# Patient Record
Sex: FEMALE | Race: WHITE | NOT HISPANIC OR LATINO | URBAN - METROPOLITAN AREA
[De-identification: names, ages, dates, MRNs, and addresses within clinical notes are randomized per-mention and may not be internally consistent; named-entity substitution may affect disease eponyms.]

---

## 2021-09-16 ENCOUNTER — OFFICE VISIT - RIVER FALLS (OUTPATIENT)
Dept: FAMILY MEDICINE | Facility: CLINIC | Age: 61
End: 2021-09-16

## 2021-09-16 ASSESSMENT — MIFFLIN-ST. JEOR: SCORE: 1527.74

## 2021-11-17 ENCOUNTER — OFFICE VISIT - RIVER FALLS (OUTPATIENT)
Dept: FAMILY MEDICINE | Facility: CLINIC | Age: 61
End: 2021-11-17

## 2021-11-17 ASSESSMENT — MIFFLIN-ST. JEOR: SCORE: 1476.94

## 2022-02-11 VITALS — HEIGHT: 68 IN | WEIGHT: 201.1 LBS | BODY MASS INDEX: 30.48 KG/M2

## 2022-02-11 VITALS — HEIGHT: 68 IN | WEIGHT: 189.9 LBS | BODY MASS INDEX: 28.78 KG/M2

## 2022-02-16 NOTE — NURSING NOTE
Quick Intake Entered On:  11/17/2021 12:08 PM CST    Performed On:  11/17/2021 12:07 PM CST by Suzan Champion               Summary   Weight Measured :   189.9 lb(Converted to: 189 lb 14 oz, 86.137 kg)    Height Measured :   67.5 in(Converted to: 5 ft 7 in, 171.45 cm)    Body Mass Index :   29.3 kg/m2 (HI)    Body Surface Area :   2.02 m2   Suzan Champion - 11/17/2021 12:07 PM CST

## 2022-02-16 NOTE — PROGRESS NOTES
Patient:   KACEY MC            MRN: [id number]            FIN: [id number]               Age:   61 years     Sex:  Female     :  1960   Associated Diagnoses:   Diabetes mellitus type 2; Hypercholesterolemia; Obesity; Hypertension   Author:   Suzan Champion      Visit Information   Visit type:  Diabetes Self Management Education .    Referral source:  Tobi IVERSON, Dr. Janina Soto.       Chief Complaint   DM Type II, Hypercholesterolemia, HTN, Obesity      History of Present Illness   Pt here with increasing a1c now at 8% on triple therapy.    Pt is given 14 day sample Kimberly 2 sensor to help pt understand how glycemic control is being affected by nutritional intake.      Discussed nutrition, diabetes, and lifestyle management with pt  Nutrition:  Pt does not follow a specific carb controlled meal plan  Fluids: water diet soda  Physical Activity:  no set exercise routine  Stress:   low/ mod  Sleep: good  Support: family   BG Testing: has not been testing and does not like to test  DM related medication: Ozempic 1mg weekly, Metformin XR 500mg 4 tabs daily, Jardiance 25mg daily - taking meds as directed   Routine diabetes care:    Dilated Retinal Eye Exam:  per pt annual    Skin: intact  Dental: goes 2x/ year  Feet: monofilament test completed with MD   Immunizations: influenza and pneumonia vaccines completed   Hgb A1c: 8% =  183mg/dL estimated average glucose      Health Status   Allergies:    Allergic Reactions (Selected)  Severity Not Documented  Byetta Prefilled Pen (Lip swelling)  Ibuprofen (No reactions were documented)  Naproxen (Hives)  Pertussis vaccines (Hives)   Medications:  (Selected)   Prescriptions  Prescribed  Advair Diskus 100 mcg-50 mcg inhalation powder: 1 puff(s), inh, bid, # 60 EA, 11 Refill(s), Pharmacy: [facility] [place], Please keep on file; pt will contact when needed., 1 puff(s) inh bid  Byetta Prefilled Pen 10 mcg/0.04 mL subcutaneous solution: ( 10 mcg ),  subcutaneous, bid, # 2 mL, 0 Refill(s), Pharmacy: [facility], due for diabetic visit (5/12/16), 10 mcg subcutaneous bid  Cipro 500 mg oral tablet: 1 tab(s) ( 500 mg ), PO, q12hr, # 6 tab(s), 0 Refill(s), Type: Maintenance, Pharmacy: [facility], 1 tab(s) po q12 hrs,x3 day(s)  Claritin 10 mg oral tablet: 1 tab(s) ( 10 mg ), PO, Daily, # 30 tab(s), 0 Refill(s), Type: Maintenance, Pharmacy: [facility], 1 tab(s) po daily,x30 day(s)  Insupen UltraFine 29G x 12MM: Insupen UltraFine 29G x 12MM, See Instructions, Instructions: Use as directed twice daily., # 100 EA, 11 Refill(s), Type: Maintenance, Pharmacy: [facility]  One Touch Ultra Blue Test Strips: One Touch Ultra Blue Test Strips, See Instructions, Instructions: Test twice daily., Supply, # 100 EA, 5 Refill(s), Type: Maintenance, Pharmacy: [facility], Test twice daily.  Unifine PNTP 12mm 29G: Unifine PNTP 12mm 29G, See Instructions, Instructions: USE AS DIRECTED TWICE DAILY., Supply, # 100 unknown unit, 10 Refill(s), Type: Maintenance, Pharmacy: [facility], USE AS DIRECTED TWICE DAILY.  Ventolin HFA 90 mcg/inh inhalation aerosol: See Instructions, Instructions: INHALE 2 PUFFS BY MOUTH 1 TO 4 TIMES A DAY AS NEEDED FOR ASTHMA, # 18 spray(s), 5 Refill(s), Pharmacy: [facility], Please keep on file; pt will contact when needed., INHALE 2 PUFFS BY MOUTH 1 TO 4 MAGY...  Vytorin 10 mg-80 mg oral tablet: 1 tab(s), po, daily, # 30 tab(s), 0 Refill(s), Type: Maintenance, Pharmacy: [facility], 1 tab(s) po daily  aspirin 81 mg oral tablet: 1 tab(s) ( 81 mg ), PO, Daily, # 30 tab(s), 0 Refill(s), Type: Maintenance, OTC (Rx)  furosemide 20 mg oral tablet: 1 tab(s) ( 20 mg ), po, daily, # 30 tab(s), 6 Refill(s), Type: Maintenance, Pharmacy: [facility] [place], 1 tab(s) po daily  lisinopril 10 mg oral tablet: 1 tab(s) ( 10 mg  ), po, daily, # 30 tab(s), 0 Refill(s), Type: Maintenance, Pharmacy: [facility] [place], Pt needs to be seen to establish care w/ new HCP, 1 tab(s) po daily  metFORMIN 1000 mg oral tablet: 1 tab(s) ( 1,000 mg ), po, bid, # 60 tab(s), 10 Refill(s), Type: Maintenance, Pharmacy: [facility] [place], Please inform pt that she needs to establish care w/ new HCP and fasting labs for further refills--Thanks, 1 tab(s) po bid  Documented Medications  Documented  Tylenol: po, tab(s), 0 Refill(s), Type: Maintenance,    Medications          *denotes recorded medication          Insupen UltraFine 29G x 12MM: See Instructions, Use as directed twice daily., 100 EA.          One Touch Ultra Blue Test Strips: See Instructions, Test twice daily., 100 EA.          Unifine PNTP 12mm 29G: See Instructions, USE AS DIRECTED TWICE DAILY., 100 unknown unit.          *Tylenol: po, tab(s).          Ventolin HFA 90 mcg/inh inhalation aerosol: See Instructions, INHALE 2 PUFFS BY MOUTH 1 TO 4 TIMES A DAY AS NEEDED FOR ASTHMA, 18 spray(s).          aspirin 81 mg oral tablet: 81 mg, 1 tab(s), PO, Daily, 30 tab(s).          Cipro 500 mg oral tablet: 500 mg, 1 tab(s), PO, q12hr, for 3 day(s), 6 tab(s), 0 Refill(s).          Byetta Prefilled Pen 10 mcg/0.04 mL subcutaneous solution: 10 mcg, subcutaneous, bid, 2 mL, 0 Refill(s).          Vytorin 10 mg-80 mg oral tablet: 1 tab(s), po, daily, 30 tab(s), 0 Refill(s).          Advair Diskus 100 mcg-50 mcg inhalation powder: 1 puff(s), inh, bid, 60 EA.          furosemide 20 mg oral tablet: 20 mg, 1 tab(s), po, daily, 30 tab(s), 6 Refill(s).          lisinopril 10 mg oral tablet: 10 mg, 1 tab(s), po, daily, 30 tab(s), 0 Refill(s).          Claritin 10 mg oral tablet: 10 mg, 1 tab(s), PO, Daily, 30 tab(s).          metFORMIN 1000 mg oral tablet: 1,000 mg, 1 tab(s), po, bid, 60 tab(s), 10 Refill(s).       Problem list:    All Problems (Selected)  Sleep apnea / ICD-9-.09 /  Confirmed  Seasonal Allergies / ICD-9-.9 / Confirmed  Obesity / ICD-9-.00 / Probable  Hypertension / SNOMED CT 8884969593 / Confirmed  Hypercholesterolemia / SNOMED CT 52089908 / Confirmed  Diabetes mellitus type 2 / SNOMED CT 002740717 / Confirmed  SHERIN Exposure Affecting Fetus/[place] Via Placenta/Breast Milk / ICD-9-.76 / Confirmed  Asthma / ICD-9-.90 / Confirmed      Histories   Past Medical History:    Active  Diabetes mellitus type 2 (634184595): Onset in the month of 2006 at 45 years  Comments:  3/17/2010 CDT 2:38 PM CDT - Talya Restrepo CMA  as of 10/09 enrolled in [employer]  study...diabetes care done through  [employer]   Sleep apnea (786.09): Onset in the month of 2005 at 44 years  SHERIN Exposure Affecting Fetus/ Via Placenta/Breast Milk (760.76)  Asthma (493.90)  Hypertension (7565299482)  Hypercholesterolemia (73776116)  Seasonal Allergies (477.9)   Family History:    Cancer  Mother (Agatha, )  Brain tumor  Niece  Nephew  Alzheimer's disease  Mother (Agatha, )     Procedure history:    Colonoscopy (SNOMED CT 366093990) performed by Lonny Griffin on 2013 at 53 Years.  Comments:  2013 1:53 PM CST - Gilbert PEREZ, Twila  Sedation: fentanyl, midazolam  Hyperplastic polyps x2; 6-8mm tubular adenoma x2  Repeat in 5 years   x 2.  Comments:  3/14/2011 10:39 AM SULEIMANT - Ashley Kay CMA  ,   Social History:        Alcohol Assessment: Current            2 drinks 2 x per week.      Tobacco Assessment: Past            Past                     Comments:                      10/21/2011 - Chrissy Stephenson .      Employment and Education Assessment            Employed, Work/School description: .      Home and Environment Assessment            Marital status: .  Spouse/Partner name: Han Champagne.      Exercise and Physical Activity Assessment: Regular exercise            Exercise frequency: Daily.   Exercise type: Walking, Elliptical.        Physical Examination   Measurements from flowsheet : Measurements   9/16/2021 3:24 PM CDT Height Measured - Standard 67.5 in    Weight Measured - Standard 201.1 lb    BSA 2.08 m2    Body Mass Index 31.03 kg/m2  HI         Health Maintenance      Recommendations     Pending (in the next year)        OverDue           DM - Communication with Managing Provider due  07/03/13  and every 1  year(s)           DM - Foot Exam due  05/23/15  and every 1  year(s)           DM - HgbA1c due  10/14/15  and every 3  month(s)           Lipid Disorders Screen due  12/16/15  and every 1  year(s)           Breast Cancer Screen due  01/02/16  and every 1  year(s)           DM - Microalbumin due  03/14/16  and every 1  year(s)           Tobacco Use Screen due  07/20/16  and every 1  year(s)           Alcohol Misuse Screen due  07/20/16  and every 1  year(s)           Depression Screen due  07/20/16  and every 1  year(s)           DM - Eye Exam due  08/20/16  and every 1  year(s)           High Blood Pressure Screen due  11/09/16  and every 1  year(s)           Aspirin Therapy for Prevention of CVD and CRC due  02/27/17  and every 5  year(s)           Colorectal Cancer Screen (Colonoscopy) due  11/22/18  and every 5  year(s)           Tetanus Vaccine due  03/01/21  and every 10  year(s)        Due            Influenza Vaccine due  09/01/21  and every 1  year(s)           Hepatitis C Screen [id number] due  09/16/21  One-time only           Lung Cancer Screen due  09/16/21  and every 1  year(s)     Satisfied (in the past 1 year)        Satisfied            Body Mass Index Check on  09/16/21.           Cervical Cancer Screen (if sexually active) on  11/23/20.           Obesity Screen and Counseling on  09/16/21.          Impression and Plan   Diagnosis     Diabetes mellitus type 2 (BHQ73-RQ E11.9).     Hypercholesterolemia (UTK55-OZ E78.00).     Obesity (PEQ22-DA E66.9).     Hypertension (NAO04-WP  I10).         Counseled: Patient, AADE7 Self Care Behaviors   Today the patient was instructed on the basic physiology of diabetes mellitus, BG testing, and lifestyle guidelines.  Healthy Eating - Education on what foods are primary sources of carbohydrates and how intake affects BG readings, edu on carbohydrate counting, reading food labels, appropriate portion sizes, well balanced meals, diabetic plate method as a general rule.  Patient is provided with numerous ideas to incorporate dietary recommendations, meal planning and preparation, mindful eating techniques and weight loss tips.    Being Active - Education on how exercise helps with :    - lowering BG by increasing the muscles ability to take up and use glucose   - weight loss   - healthier heart (improve lipid profile)   - improve sleep, mood, energy   - decrease stress      Monitoring - Began 14 day Freestyle Kimberly 2 Sensor   Pt is shown appropriate use   Detailed startup checklist completed  - Introduction to CGM and components  - Set Up Display    Low and high settings     - Insert Sensor    Choosing a sensor site - back of upper am     - Start sensor on    Wait 1 hour warmup     - Home Screen Overview   Sensor Glucose Reading   Trend Arrow   Trend Graph   High and low levels  - Treatment Decision   Use meter if symptoms do not match reader     - Ending Sensor Session   Remove sensor   - Freestyle Support Team resources     Taking Medication - Education on the 8 core defects of type II diabetes and how the different classes of medications have different primary physiological actions.  Discussed potential to add medication in the future if a1c does not improve.      Problem Solving - medical support team assistance, resources provided (written and apps, internet); good control of stress  Healthy Coping - support of friends, family, and medical support team   Reducing Risks - Will discuss at f/u apts.  Weight loss goal of 7 - 10% of current  body weight pounds as a reasonable goal to help increase insulin sensitivity   .        Goals:  1.  A1c <6.5%  2.  Freestyle Kimberly II 14 day trial, contact MD for order if she likes it - reader given, will only need orders for Freestyle Kimberly 2 sensors.    3.  Physical active 150 min/ week   4.  Carb controlled heart healthy meal plan <130gm CHO/ day; <200mg Cholesterol/ day   5.  Take medications as directed continue Ozempic 1mg weekly, Metformin XR 500mg 4 tabs daily, Jardiance 25mg daily    Follow up in 4-6 weeks       Professional Services   Time spent with pt 30 min   cc Dr. Britton   cc Dr. [facility]

## 2022-02-16 NOTE — NURSING NOTE
Quick Intake Entered On:  9/16/2021 3:25 PM CDT    Performed On:  9/16/2021 3:24 PM CDT by Suzan Champion               Summary   Weight Measured :   201.1 lb(Converted to: 201 lb 2 oz, 91.217 kg)    Height Measured :   67.5 in(Converted to: 5 ft 7 in, 171.45 cm)    Body Mass Index :   31.03 kg/m2 (HI)    Body Surface Area :   2.08 m2   Suzan Champion - 9/16/2021 3:24 PM CDT

## 2022-02-16 NOTE — PROGRESS NOTES
"   Patient:   KACEY MC            MRN: [id number]            FIN: [id number]               Age:   61 years     Sex:  Female     :  1960   Associated Diagnoses:   Diabetes mellitus type 2; Hypercholesterolemia; Obesity; Hypertension   Author:   Suzan Chapmion      Visit Information   Visit type:  Diabetes Self Management Education .    Referral source:  Tobi IVERSON, Dr. Janina Soto.       Chief Complaint   DM Type II, Hypercholesterolemia, HTN, Obesity      History of Present Illness   2021  Pt here with increasing a1c now at 8% on triple therapy.    Pt is given 14 day sample Kimberly 2 sensor to help pt understand how glycemic control is being affected by nutritional intake.      2021  Follow up education.  Significant improvement!  Pt loves the Freestyle Kimberly II and is using it to help control glucose levels by monitoring and controlling carbohydrate intake.      Discussed nutrition, diabetes, and lifestyle management with pt  Nutrition:  Reading labels and following carb guidelines \"for the most part\", really learning to understand how intake is affecting glucose reading   Fluids: water diet soda  Physical Activity:  willing to start using her elliptical - will start with small realist goals   does take the dogs for a walk a couple days/ week   Stress:   low/ mod  Sleep: good  Support: family     Average sensor glucose 134 mg/dL (28 day)     Time in target range 70 - 180  94%  Very High >250   0%  High >180     6%  Low <70     0%  Very Low <54   0%  CGM active     94%     DM related medication: Ozempic 1mg weekly, Metformin XR 500mg 4 tabs daily, Jardiance 25mg daily - taking meds as directed   Routine diabetes care:    Dilated Retinal Eye Exam:  per pt annual    Skin: intact  Dental: goes 2x/ year  Feet: monofilament test completed with MD   Immunizations: influenza and pneumonia vaccines completed   Hgb A1c: 8% =  183mg/dL estimated average glucose      Health Status   Allergies:  "   Allergic Reactions (Selected)  Severity Not Documented  Byetta Prefilled Pen (Lip swelling)  Ibuprofen (No reactions were documented)  Naproxen (Hives)  Pertussis vaccines (Hives)   Medications:  (Selected)   Prescriptions  Prescribed  Advair Diskus 100 mcg-50 mcg inhalation powder: 1 puff(s), inh, bid, # 60 EA, 11 Refill(s), Pharmacy: [facility], Please keep on file; pt will contact when needed., 1 puff(s) inh bid  Byetta Prefilled Pen 10 mcg/0.04 mL subcutaneous solution: ( 10 mcg ), subcutaneous, bid, # 2 mL, 0 Refill(s), Pharmacy: [facility], due for diabetic visit (5/12/16), 10 mcg subcutaneous bid  Cipro 500 mg oral tablet: 1 tab(s) ( 500 mg ), PO, q12hr, # 6 tab(s), 0 Refill(s), Type: Maintenance, Pharmacy: [facility], 1 tab(s) po q12 hrs,x3 day(s)  Claritin 10 mg oral tablet: 1 tab(s) ( 10 mg ), PO, Daily, # 30 tab(s), 0 Refill(s), Type: Maintenance, Pharmacy: [facility], 1 tab(s) po daily,x30 day(s)  Insupen UltraFine 29G x 12MM: Insupen UltraFine 29G x 12MM, See Instructions, Instructions: Use as directed twice daily., # 100 EA, 11 Refill(s), Type: Maintenance, Pharmacy: [facility]  One Touch Ultra Blue Test Strips: One Touch Ultra Blue Test Strips, See Instructions, Instructions: Test twice daily., Supply, # 100 EA, 5 Refill(s), Type: Maintenance, Pharmacy: [facility], Test twice daily.  Unifine PNTP 12mm 29G: Unifine PNTP 12mm 29G, See Instructions, Instructions: USE AS DIRECTED TWICE DAILY., Supply, # 100 unknown unit, 10 Refill(s), Type: Maintenance, Pharmacy: [facility], USE AS DIRECTED TWICE DAILY.  Ventolin HFA 90 mcg/inh inhalation aerosol: See Instructions, Instructions: INHALE 2 PUFFS BY MOUTH 1 TO 4 TIMES A DAY AS NEEDED FOR ASTHMA, # 18 spray(s), 5 Refill(s), Pharmacy: [facility], Please keep on file; pt will contact when needed., INHALE 2  PUFFS BY MOUTH 1 TO 4 MAGY...  Vytorin 10 mg-80 mg oral tablet: 1 tab(s), po, daily, # 30 tab(s), 0 Refill(s), Type: Maintenance, Pharmacy: [facility], 1 tab(s) po daily  aspirin 81 mg oral tablet: 1 tab(s) ( 81 mg ), PO, Daily, # 30 tab(s), 0 Refill(s), Type: Maintenance, OTC (Rx)  furosemide 20 mg oral tablet: 1 tab(s) ( 20 mg ), po, daily, # 30 tab(s), 6 Refill(s), Type: Maintenance, Pharmacy: [facility], 1 tab(s) po daily  lisinopril 10 mg oral tablet: 1 tab(s) ( 10 mg ), po, daily, # 30 tab(s), 0 Refill(s), Type: Maintenance, Pharmacy: [facility], Pt needs to be seen to establish care w/ new HCP, 1 tab(s) po daily  metFORMIN 1000 mg oral tablet: 1 tab(s) ( 1,000 mg ), po, bid, # 60 tab(s), 10 Refill(s), Type: Maintenance, Pharmacy: [facility], Please inform pt that she needs to establish care w/ new HCP and fasting labs for further refills--Thanks, 1 tab(s) po bid  Documented Medications  Documented  Tylenol: po, tab(s), 0 Refill(s), Type: Maintenance,    Medications          *denotes recorded medication          Insupen UltraFine 29G x 12MM: See Instructions, Use as directed twice daily., 100 EA.          One Touch Ultra Blue Test Strips: See Instructions, Test twice daily., 100 EA.          Unifine PNTP 12mm 29G: See Instructions, USE AS DIRECTED TWICE DAILY., 100 unknown unit.          *Tylenol: po, tab(s).          Ventolin HFA 90 mcg/inh inhalation aerosol: See Instructions, INHALE 2 PUFFS BY MOUTH 1 TO 4 TIMES A DAY AS NEEDED FOR ASTHMA, 18 spray(s).          aspirin 81 mg oral tablet: 81 mg, 1 tab(s), PO, Daily, 30 tab(s).          Cipro 500 mg oral tablet: 500 mg, 1 tab(s), PO, q12hr, for 3 day(s), 6 tab(s), 0 Refill(s).          Byetta Prefilled Pen 10 mcg/0.04 mL subcutaneous solution: 10 mcg, subcutaneous, bid, 2 mL, 0 Refill(s).          Vytorin 10 mg-80 mg oral tablet: 1 tab(s), po, daily, 30 tab(s), 0 Refill(s).           Advair Diskus 100 mcg-50 mcg inhalation powder: 1 puff(s), inh, bid, 60 EA.          furosemide 20 mg oral tablet: 20 mg, 1 tab(s), po, daily, 30 tab(s), 6 Refill(s).          lisinopril 10 mg oral tablet: 10 mg, 1 tab(s), po, daily, 30 tab(s), 0 Refill(s).          Claritin 10 mg oral tablet: 10 mg, 1 tab(s), PO, Daily, 30 tab(s).          metFORMIN 1000 mg oral tablet: 1,000 mg, 1 tab(s), po, bid, 60 tab(s), 10 Refill(s).       Problem list:    All Problems  Diabetes mellitus type 2 / SNOMED CT 881094004 / Confirmed  Polyp of colon / SNOMED CT 614738591 / Confirmed  Obstructive sleep apnea (adult) (pediatric) / SNOMED CT 738161943 / Confirmed  Obesity, unspecified / SNOMED CT 0247399861 / Probable  Mixed hyperlipidemia / SNOMED CT 230027677 / Confirmed  Insomnia due to medical condition / SNOMED CT 858026137 / Confirmed  Hypertension / SNOMED CT 6194667674 / Confirmed  Hypercholesterolemia / SNOMED CT 56090506 / Confirmed  Dysuria / SNOMED CT 26051638 / Confirmed  Unspecified asthma, uncomplicated / SNOMED CT 342288193 / Confirmed  Allergic rhinitis, unspecified / SNOMED CT 965374672 / Confirmed  Inactive: Gestational diabetes / ICD-9-.80  Inactive: SHERIN Exposure Affecting Fetus/ Via Placenta/Breast Milk / ICD-9-.76  Resolved: Pregnancy / SNOMED CT 729573480  Resolved: Pregnancy / SNOMED CT 986561781      Histories   Past Medical History:    Active  Diabetes mellitus type 2 (327720884): Onset in the month of 2006 at 45 years  Comments:  3/17/2010 CDT 2:38 PM CDT - Talya Restrepo CMA  as of 10/09 enrolled in [facility] [employer]  study...diabetes care done through  of   Obstructive sleep apnea (adult) (pediatric) (521569520): Onset in the month of 2005 at 44 years  Unspecified asthma, uncomplicated (047657709)  Hypertension (9961953333)  Hypercholesterolemia (02841322)  Allergic rhinitis, unspecified (224293706)  Obesity, unspecified (4598658898)  Dysuria (35795354)  Mixed hyperlipidemia  (027977681)  Insomnia due to medical condition (870250104)  Polyp of colon (491577285)  Resolved  Pregnancy (359497449):  Resolved in  at 34 years.  Pregnancy (535944961):  Resolved in  at 32 years.   Family History:    Cancer  Mother (Agatha, )  Brain tumor  Niece  Nephew  Alzheimer's disease  Mother (Agatha, )     Procedure history:    Colonoscopy (SNOMED CT 699585667) on 2019 at 58 Years.  Comments:  2021 12:48 PM CDT - Josselin Hinkle  Repeat in 3 years.  Colonoscopy (SNOMED CT 392529505) performed by Lonny Griffin on 2013 at 53 Years.  Comments:  2013 1:53 PM CST - Twila Hunt RN  Sedation: fentanyl, midazolam  Hyperplastic polyps x2; 6-8mm tubular adenoma x2  Repeat in 5 years   Social History:        Alcohol Assessment: Current            2 drinks 2 x per week.            1-2 times per week      Tobacco Assessment: Past            Past                     Comments:                      10/21/2011 - Chrissy Stephenson .      Substance Abuse Assessment: Past            Marijuana      Employment and Education Assessment            Employed, Work/School description: .      Home and Environment Assessment            Marital status: .  Spouse/Partner name: Han Champagne.      Nutrition and Health Assessment            Type of diet: Regular.      Exercise and Physical Activity Assessment: Regular exercise            Exercise frequency: Daily.  Exercise type: Walking, Elliptical.      Sexual Assessment            Sexually active: Yes.  Identifies as female, Sexual orientation: Straight or heterosexual.  Contraceptive Use               Details: None.        Physical Examination   Measurements from flowsheet : Measurements   2021 12:07 PM CST Height Measured - Standard 67.5 in    Weight Measured - Standard 189.9 lb    BSA 2.02 m2    Body Mass Index 29.3 kg/m2  HI         Impression and Plan   Diagnosis      Diabetes mellitus type 2 (VWO96-LW E11.9).     Hypercholesterolemia (OFR17-SH E78.00).     Obesity (BSJ38-MO E66.9).     Hypertension (PLS75-CK I10).       Counseled: Patient, [place] Self Care Behaviors   Today the patient was instructed on the basic physiology of diabetes mellitus, BG testing, and lifestyle guidelines.    Healthy Eating - Discussed small dietary changes that pt is willing to incorporate into diet, reviewed carb guidelines, pt understands label reading.   Education on healthy eating while at family/ social/ holiday gatherings.  Pt given references for recipes, meal planning ideas to incorporate dietary recommendations, meal planning and preparation  Weight loss tips with mindful eating.  Education review on decreasing cardiovascular risk with following Therapeutic Lifestyle Changes (TLC) nutrition plan for heart healthy eating.   Being Active - Education on how exercise helps with :    - lowering BG by increasing the muscles ability to take up and use glucose   - weight loss   - healthier heart (improve lipid profile)   - improve sleep, mood, energy   - decrease stress      Monitoring - Continue Freestyle Kimberly 2 Sensor, congratulated pt on significant improvement   Taking Medication - Education on the 8 core defects of type II diabetes and how the different classes of medications have different primary physiological actions.  Discussed potential to add medication in the future if a1c does not improve.      Problem Solving - medical support team assistance, resources provided (written and apps, internet); good control of stress  Healthy Coping - support of friends, family, and medical support team   Reducing Risks - Education on importance of good glucose control to help reduce the potential for developing microvascular and macrovascular complications associated with high blood glucose over time.    Education on the following complications    *heart attack/cardiovascular disease - prevention with  heart healthy diet, daily activity, and weight control   *retinopathy - annual eye exam   *nephropathy - discussion on annual or prn kidney function labs with urinalysis    *stroke - maintaining recommended glucose control   *peripheral arterial disease - regular activity, maintaining recommended glucose control   *neuropathy - monofilament testing, regular activity, maintaining recommended glucose control  Appropriate foot care education  Vaccinations as recommended influenza, pneumonia etc.   Routine dental appt.'s for prevention of periodontal diseases   Importance of adequate sleep   Good skin care, monitor for any open areas, sores, or cuts.      Pt able to verbalize understanding of above education, handouts provided for additional resources.      Goals:  1.  A1c <6.5%  2.  Freestyle Kimberly II 14 day sensor   3.  Physical active 150 min/ week   4.  Carb controlled heart healthy meal plan <130gm CHO/ day; <200mg Cholesterol/ day   5.  Take medications as directed continue Ozempic 1mg weekly, Metformin XR 500mg 4 tabs daily, Jardiance 25mg daily    Follow up in 4 months       Professional Services   Time spent with pt 30 min   cc Dr. Britton   cc Dr. Janina [facility]